# Patient Record
Sex: MALE | Race: OTHER | NOT HISPANIC OR LATINO | URBAN - METROPOLITAN AREA
[De-identification: names, ages, dates, MRNs, and addresses within clinical notes are randomized per-mention and may not be internally consistent; named-entity substitution may affect disease eponyms.]

---

## 2018-10-27 ENCOUNTER — INPATIENT (INPATIENT)
Facility: HOSPITAL | Age: 23
LOS: 1 days | Discharge: HOME | DRG: 918 | End: 2018-10-29
Attending: INTERNAL MEDICINE | Admitting: INTERNAL MEDICINE
Payer: MEDICAID

## 2018-10-27 VITALS
OXYGEN SATURATION: 95 % | WEIGHT: 160.06 LBS | TEMPERATURE: 98 F | HEART RATE: 138 BPM | SYSTOLIC BLOOD PRESSURE: 164 MMHG | RESPIRATION RATE: 16 BRPM | DIASTOLIC BLOOD PRESSURE: 87 MMHG

## 2018-10-27 DIAGNOSIS — F43.20 ADJUSTMENT DISORDER, UNSPECIFIED: ICD-10-CM

## 2018-10-27 DIAGNOSIS — F32.9 MAJOR DEPRESSIVE DISORDER, SINGLE EPISODE, UNSPECIFIED: ICD-10-CM
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
## 2018-10-27 DIAGNOSIS — T42.6X2A POISONING BY OTHER ANTIEPILEPTIC AND SEDATIVE-HYPNOTIC DRUGS, INTENTIONAL SELF-HARM, INITIAL ENCOUNTER: ICD-10-CM

## 2018-10-27 DIAGNOSIS — G47.00 INSOMNIA, UNSPECIFIED: ICD-10-CM

## 2018-10-27 DIAGNOSIS — E86.1 HYPOVOLEMIA: ICD-10-CM

## 2018-10-27 DIAGNOSIS — R45.851 SUICIDAL IDEATIONS: ICD-10-CM

## 2018-10-27 DIAGNOSIS — I45.81 LONG QT SYNDROME: ICD-10-CM

## 2018-10-27 DIAGNOSIS — R00.0 TACHYCARDIA, UNSPECIFIED: ICD-10-CM

## 2018-10-27 LAB
ALBUMIN SERPL ELPH-MCNC: 4.5 G/DL — SIGNIFICANT CHANGE UP (ref 3.4–5)
ALP SERPL-CCNC: 57 U/L — SIGNIFICANT CHANGE UP (ref 40–120)
ALT FLD-CCNC: 26 U/L — SIGNIFICANT CHANGE UP (ref 12–42)
ANION GAP SERPL CALC-SCNC: 10 MMOL/L — SIGNIFICANT CHANGE UP (ref 9–16)
APAP SERPL-MCNC: <2 UG/ML — LOW (ref 10–30)
AST SERPL-CCNC: 30 U/L — SIGNIFICANT CHANGE UP (ref 15–37)
BASOPHILS NFR BLD AUTO: 0.6 % — SIGNIFICANT CHANGE UP (ref 0–2)
BILIRUB SERPL-MCNC: 0.8 MG/DL — SIGNIFICANT CHANGE UP (ref 0.2–1.2)
BUN SERPL-MCNC: 19 MG/DL — SIGNIFICANT CHANGE UP (ref 7–23)
CALCIUM SERPL-MCNC: 10.4 MG/DL — SIGNIFICANT CHANGE UP (ref 8.5–10.5)
CHLORIDE SERPL-SCNC: 99 MMOL/L — SIGNIFICANT CHANGE UP (ref 96–108)
CO2 SERPL-SCNC: 25 MMOL/L — SIGNIFICANT CHANGE UP (ref 22–31)
CREAT SERPL-MCNC: 1.34 MG/DL — HIGH (ref 0.5–1.3)
EOSINOPHIL NFR BLD AUTO: 1.2 % — SIGNIFICANT CHANGE UP (ref 0–6)
ETHANOL SERPL-MCNC: < 3 MG/DL — SIGNIFICANT CHANGE UP
GLUCOSE SERPL-MCNC: 99 MG/DL — SIGNIFICANT CHANGE UP (ref 70–99)
HCT VFR BLD CALC: 46.4 % — SIGNIFICANT CHANGE UP (ref 39–50)
HGB BLD-MCNC: 16.4 G/DL — SIGNIFICANT CHANGE UP (ref 13–17)
IMM GRANULOCYTES NFR BLD AUTO: 0.2 % — SIGNIFICANT CHANGE UP (ref 0–1.5)
LYMPHOCYTES # BLD AUTO: 30 % — SIGNIFICANT CHANGE UP (ref 13–44)
MAGNESIUM SERPL-MCNC: 1.6 MG/DL — SIGNIFICANT CHANGE UP (ref 1.6–2.6)
MCHC RBC-ENTMCNC: 29.5 PG — SIGNIFICANT CHANGE UP (ref 27–34)
MCHC RBC-ENTMCNC: 35.3 G/DL — SIGNIFICANT CHANGE UP (ref 32–36)
MCV RBC AUTO: 83.6 FL — SIGNIFICANT CHANGE UP (ref 80–100)
MONOCYTES NFR BLD AUTO: 9.1 % — SIGNIFICANT CHANGE UP (ref 2–14)
NEUTROPHILS NFR BLD AUTO: 58.9 % — SIGNIFICANT CHANGE UP (ref 43–77)
PCP SPEC-MCNC: SIGNIFICANT CHANGE UP
PLATELET # BLD AUTO: 218 K/UL — SIGNIFICANT CHANGE UP (ref 150–400)
POTASSIUM SERPL-MCNC: 3.3 MMOL/L — LOW (ref 3.5–5.3)
POTASSIUM SERPL-SCNC: 3.3 MMOL/L — LOW (ref 3.5–5.3)
PROT SERPL-MCNC: 8.8 G/DL — HIGH (ref 6.4–8.2)
RBC # BLD: 5.55 M/UL — SIGNIFICANT CHANGE UP (ref 4.2–5.8)
RBC # FLD: 11.9 % — SIGNIFICANT CHANGE UP (ref 10.3–14.5)
SALICYLATES SERPL-MCNC: 4.6 MG/DL — SIGNIFICANT CHANGE UP (ref 2.8–20)
SODIUM SERPL-SCNC: 134 MMOL/L — SIGNIFICANT CHANGE UP (ref 132–145)
WBC # BLD: 6.6 K/UL — SIGNIFICANT CHANGE UP (ref 3.8–10.5)
WBC # FLD AUTO: 6.6 K/UL — SIGNIFICANT CHANGE UP (ref 3.8–10.5)

## 2018-10-27 PROCEDURE — 70450 CT HEAD/BRAIN W/O DYE: CPT | Mod: 26

## 2018-10-27 PROCEDURE — 99291 CRITICAL CARE FIRST HOUR: CPT

## 2018-10-27 PROCEDURE — 93010 ELECTROCARDIOGRAM REPORT: CPT

## 2018-10-27 RX ORDER — SODIUM CHLORIDE 9 MG/ML
2000 INJECTION INTRAMUSCULAR; INTRAVENOUS; SUBCUTANEOUS ONCE
Qty: 0 | Refills: 0 | Status: COMPLETED | OUTPATIENT
Start: 2018-10-27 | End: 2018-10-27

## 2018-10-27 RX ORDER — MIDAZOLAM HYDROCHLORIDE 1 MG/ML
4 INJECTION, SOLUTION INTRAMUSCULAR; INTRAVENOUS ONCE
Qty: 0 | Refills: 0 | Status: DISCONTINUED | OUTPATIENT
Start: 2018-10-27 | End: 2018-10-27

## 2018-10-27 RX ADMIN — MIDAZOLAM HYDROCHLORIDE 4 MILLIGRAM(S): 1 INJECTION, SOLUTION INTRAMUSCULAR; INTRAVENOUS at 21:31

## 2018-10-27 RX ADMIN — Medication 2 MILLIGRAM(S): at 20:45

## 2018-10-27 RX ADMIN — SODIUM CHLORIDE 2000 MILLILITER(S): 9 INJECTION INTRAMUSCULAR; INTRAVENOUS; SUBCUTANEOUS at 18:40

## 2018-10-27 RX ADMIN — Medication 2 MILLIGRAM(S): at 21:54

## 2018-10-27 NOTE — ED ADULT TRIAGE NOTE - CHIEF COMPLAINT QUOTE
as per EMS his boyfriend cheated on him and he took meds, "OD" on ambien and paroxetina (portugese med)

## 2018-10-27 NOTE — ED PROVIDER NOTE - SHIFT CHANGE DETAILS
24yo M with overdose on medications and possible unknown substance as suicidal gesture. upon arrival to the ED, patient is calm, cooperative, depressed appearing and crying. he tachycardic to 138. pupils are about 5mm bilaterally, round and reactive to light. no other findings noted on exam.  patient remains tachycardic despite 2 liter of NS.  labs and CT brain results reviewed - no acute findings noted.  pending PCC contact for further recommendations, re-eval and dispo.
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
## 2018-10-27 NOTE — ED ADULT NURSE REASSESSMENT NOTE - NS ED NURSE REASSESS COMMENT FT1
pt combative, biting lines and pulling monitoring equipment. Dr. Calderón aware. orders received and implemented. pt with patent airway, self maintained.

## 2018-10-27 NOTE — ED PROVIDER NOTE - PROGRESS NOTE DETAILS
Accept sign out from Dr. Abraham.  Pt had intentional OD on ambien and paroxetina (equivalent of Paxil).  Now with agitation and tachycardia.  Possible serotonin syndrome.  Will consult poison control center and maintain 1:1.  Will consult psych if pt stabilizes and medically clears. Accept sign out from Dr. Abraham.  Pt had intentional OD on ambien and paroxetina (equivalent of Paxil).  Now with agitation and tachycardia.  Possible serotonin syndrome.  Will consult poison control center and maintain 1:1.  Will consult psych if pt stabilizes and medically clears but may need to admit while he metabolizes.  Currently agitated but no cl Poison control center consulted.  They recommend 24 hours of observation, especially given prolonged QT.  Will add on magnesium level and monitor electrolytes.  Will replace lytes prn.  Will perform serial EKGs.  They report benzos are safe for sedation. Pt responds well to Ativan.  Will repeat EKG now.  Had discussion with Dr. Smiley of MICU and pt accepted to tele step down.

## 2018-10-27 NOTE — ED PROVIDER NOTE - OBJECTIVE STATEMENT
22 yo M visiting from [place], Cape Verdean speaking only presents to the ED with altered mental status after ingesting pills. As per EMS, the pt's boyfriend states he took "a bunch of ambien and paroxetina (Cape Verdean medicine), ran into the bathroom with a knife and said he was going to kill himself". Pt has a history of suicidal attempts in the past; he jumped off the roof of his home in [place]. Pt is unable to give a clear account; but he states he did want to kill himself. 24 yo M visiting from [place], Nigerien speaking only presents to the ED with altered mental status after ingesting pills. As per EMS, the pt's boyfriend states he took "a bunch of ambien and paroxetina (Chilean medicine), ran into the bathroom with a knife and said he was going to kill himself". Pt has a history of suicidal attempts in the past; he jumped off the roof of his home in [place]. Pt is unable to give a clear account; but he states he did want to kill himself. 22yo Mauritian speaking Male is BIBEMS from home for evaluation of suicidal ideation.  Patient is confused and is unable to provide an accurate account of today's events.     As per EMS, patient thought boyfriend was cheating on him and took several pills (Mauritian equivalent of Ambien and Paxil) and locked himself in the bathroom with knife, threatening to kill himself.     As per patient - he has had suicidal ideation and had a previous attempt where he jumped off the roof while in [place].   Patient at times denies taking the pills and at other times admits to having taken them as a suicidal gesture.  Patient also states he drank a "liquid" that he purchased on the "black market" to kill himself. Patient reports he does not know the name of the liquid he ingested or the time of ingestion.     no family present, but boyfriend (Eyad) is reportedly en route to the ED.  phone number: [phone]

## 2018-10-27 NOTE — ED ADULT NURSE REASSESSMENT NOTE - NS ED NURSE REASSESS COMMENT FT1
received pt from EWELINA Gutierrez. pt on continuous cardiac monitoring and constant observation. pt resting in stretcher. calm at this time.

## 2018-10-28 DIAGNOSIS — N17.9 ACUTE KIDNEY FAILURE, UNSPECIFIED: ICD-10-CM

## 2018-10-28 DIAGNOSIS — Z91.89 OTHER SPECIFIED PERSONAL RISK FACTORS, NOT ELSEWHERE CLASSIFIED: ICD-10-CM

## 2018-10-28 DIAGNOSIS — R63.8 OTHER SYMPTOMS AND SIGNS CONCERNING FOOD AND FLUID INTAKE: ICD-10-CM

## 2018-10-28 DIAGNOSIS — T50.902A POISONING BY UNSPECIFIED DRUGS, MEDICAMENTS AND BIOLOGICAL SUBSTANCES, INTENTIONAL SELF-HARM, INITIAL ENCOUNTER: ICD-10-CM

## 2018-10-28 DIAGNOSIS — Z29.9 ENCOUNTER FOR PROPHYLACTIC MEASURES, UNSPECIFIED: ICD-10-CM

## 2018-10-28 DIAGNOSIS — F43.20 ADJUSTMENT DISORDER, UNSPECIFIED: ICD-10-CM

## 2018-10-28 DIAGNOSIS — R00.0 TACHYCARDIA, UNSPECIFIED: ICD-10-CM

## 2018-10-28 DIAGNOSIS — R94.31 ABNORMAL ELECTROCARDIOGRAM [ECG] [EKG]: ICD-10-CM

## 2018-10-28 LAB
ALBUMIN SERPL ELPH-MCNC: 4.4 G/DL — SIGNIFICANT CHANGE UP (ref 3.3–5)
ALP SERPL-CCNC: 44 U/L — SIGNIFICANT CHANGE UP (ref 40–120)
ALT FLD-CCNC: 19 U/L — SIGNIFICANT CHANGE UP (ref 10–45)
ANION GAP SERPL CALC-SCNC: 14 MMOL/L — SIGNIFICANT CHANGE UP (ref 5–17)
AST SERPL-CCNC: 30 U/L — SIGNIFICANT CHANGE UP (ref 10–40)
BILIRUB SERPL-MCNC: 0.6 MG/DL — SIGNIFICANT CHANGE UP (ref 0.2–1.2)
BUN SERPL-MCNC: 14 MG/DL — SIGNIFICANT CHANGE UP (ref 7–23)
CALCIUM SERPL-MCNC: 9.1 MG/DL — SIGNIFICANT CHANGE UP (ref 8.4–10.5)
CHLORIDE SERPL-SCNC: 97 MMOL/L — SIGNIFICANT CHANGE UP (ref 96–108)
CO2 SERPL-SCNC: 24 MMOL/L — SIGNIFICANT CHANGE UP (ref 22–31)
CREAT SERPL-MCNC: 1.12 MG/DL — SIGNIFICANT CHANGE UP (ref 0.5–1.3)
GLUCOSE SERPL-MCNC: 107 MG/DL — HIGH (ref 70–99)
HCT VFR BLD CALC: 44.1 % — SIGNIFICANT CHANGE UP (ref 39–50)
HGB BLD-MCNC: 15.1 G/DL — SIGNIFICANT CHANGE UP (ref 13–17)
MAGNESIUM SERPL-MCNC: 2.8 MG/DL — HIGH (ref 1.6–2.6)
MCHC RBC-ENTMCNC: 28.9 PG — SIGNIFICANT CHANGE UP (ref 27–34)
MCHC RBC-ENTMCNC: 34.2 G/DL — SIGNIFICANT CHANGE UP (ref 32–36)
MCV RBC AUTO: 84.3 FL — SIGNIFICANT CHANGE UP (ref 80–100)
PHOSPHATE SERPL-MCNC: 3 MG/DL — SIGNIFICANT CHANGE UP (ref 2.5–4.5)
PLATELET # BLD AUTO: 194 K/UL — SIGNIFICANT CHANGE UP (ref 150–400)
POTASSIUM SERPL-MCNC: 3.8 MMOL/L — SIGNIFICANT CHANGE UP (ref 3.5–5.3)
POTASSIUM SERPL-SCNC: 3.8 MMOL/L — SIGNIFICANT CHANGE UP (ref 3.5–5.3)
PROT SERPL-MCNC: 7.1 G/DL — SIGNIFICANT CHANGE UP (ref 6–8.3)
RBC # BLD: 5.23 M/UL — SIGNIFICANT CHANGE UP (ref 4.2–5.8)
RBC # FLD: 12.2 % — SIGNIFICANT CHANGE UP (ref 10.3–16.9)
SODIUM SERPL-SCNC: 135 MMOL/L — SIGNIFICANT CHANGE UP (ref 135–145)
WBC # BLD: 5.4 K/UL — SIGNIFICANT CHANGE UP (ref 3.8–10.5)
WBC # FLD AUTO: 5.4 K/UL — SIGNIFICANT CHANGE UP (ref 3.8–10.5)

## 2018-10-28 PROCEDURE — 99232 SBSQ HOSP IP/OBS MODERATE 35: CPT | Mod: GC

## 2018-10-28 PROCEDURE — 99233 SBSQ HOSP IP/OBS HIGH 50: CPT | Mod: GC

## 2018-10-28 PROCEDURE — 71045 X-RAY EXAM CHEST 1 VIEW: CPT | Mod: 26

## 2018-10-28 PROCEDURE — 93010 ELECTROCARDIOGRAM REPORT: CPT

## 2018-10-28 RX ORDER — INFLUENZA VIRUS VACCINE 15; 15; 15; 15 UG/.5ML; UG/.5ML; UG/.5ML; UG/.5ML
0.5 SUSPENSION INTRAMUSCULAR ONCE
Qty: 0 | Refills: 0 | Status: COMPLETED | OUTPATIENT
Start: 2018-10-28 | End: 2018-10-29

## 2018-10-28 RX ORDER — MIDAZOLAM HYDROCHLORIDE 1 MG/ML
2 INJECTION, SOLUTION INTRAMUSCULAR; INTRAVENOUS ONCE
Qty: 0 | Refills: 0 | Status: DISCONTINUED | OUTPATIENT
Start: 2018-10-28 | End: 2018-10-28

## 2018-10-28 RX ORDER — IPRATROPIUM/ALBUTEROL SULFATE 18-103MCG
3 AEROSOL WITH ADAPTER (GRAM) INHALATION ONCE
Qty: 0 | Refills: 0 | Status: COMPLETED | OUTPATIENT
Start: 2018-10-28 | End: 2018-10-28

## 2018-10-28 RX ORDER — POTASSIUM CHLORIDE 20 MEQ
10 PACKET (EA) ORAL
Qty: 0 | Refills: 0 | Status: COMPLETED | OUTPATIENT
Start: 2018-10-28 | End: 2018-10-28

## 2018-10-28 RX ORDER — HEPARIN SODIUM 5000 [USP'U]/ML
5000 INJECTION INTRAVENOUS; SUBCUTANEOUS EVERY 8 HOURS
Qty: 0 | Refills: 0 | Status: DISCONTINUED | OUTPATIENT
Start: 2018-10-28 | End: 2018-10-29

## 2018-10-28 RX ORDER — MAGNESIUM SULFATE 500 MG/ML
2 VIAL (ML) INJECTION ONCE
Qty: 0 | Refills: 0 | Status: COMPLETED | OUTPATIENT
Start: 2018-10-28 | End: 2018-10-28

## 2018-10-28 RX ADMIN — Medication 50 GRAM(S): at 05:24

## 2018-10-28 RX ADMIN — Medication 100 MILLIEQUIVALENT(S): at 08:39

## 2018-10-28 RX ADMIN — HEPARIN SODIUM 5000 UNIT(S): 5000 INJECTION INTRAVENOUS; SUBCUTANEOUS at 23:33

## 2018-10-28 RX ADMIN — Medication 3 MILLILITER(S): at 23:33

## 2018-10-28 RX ADMIN — Medication 100 MILLIEQUIVALENT(S): at 05:55

## 2018-10-28 RX ADMIN — Medication 100 MILLIEQUIVALENT(S): at 07:15

## 2018-10-28 RX ADMIN — MIDAZOLAM HYDROCHLORIDE 2 MILLIGRAM(S): 1 INJECTION, SOLUTION INTRAMUSCULAR; INTRAVENOUS at 01:32

## 2018-10-28 RX ADMIN — HEPARIN SODIUM 5000 UNIT(S): 5000 INJECTION INTRAVENOUS; SUBCUTANEOUS at 13:54

## 2018-10-28 RX ADMIN — HEPARIN SODIUM 5000 UNIT(S): 5000 INJECTION INTRAVENOUS; SUBCUTANEOUS at 05:55

## 2018-10-28 NOTE — PROGRESS NOTE ADULT - PROBLEM SELECTOR PLAN 1
Admits to suicide attempt and ex-boyfriend (larisa) confirms previous attempts. S/p 6mg total versed and 4mg total of ativan for agitation at [facility]. Patient redirected this AM upon arrival and acute agitation.  -psych for SI  -c/w Constant obs 1:1  -Hold Home Paxil and ambien in setting of OD on these medications.   -qtc this am 442, medically cleared for psych  - UTox negative Admits to suicide attempt and ex-boyfriend (larisa) confirms previous attempts. S/p 6mg total versed and 4mg total of ativan for agitation at [facility]. Patient redirected this AM upon arrival and acute agitation.  -Atarax 50mg q6H prn insomnia/agitation/anxiety as per psych  -psych consulted for SI, will eval for possibly 8ur admission  -c/w Constant obs 1:1  -Hold Home Paxil and ambien in setting of OD on these medications.   -qtc this am 442, medically cleared for psych  - UTox negative

## 2018-10-28 NOTE — H&P ADULT - ATTENDING COMMENTS
Suspected ambien and SSRI overdose (hx of prior attempts and SI/HI). No concern for serotonin syndrome. Hemodynamically stable. Floor eligible.    35 minutes critical time spent.

## 2018-10-28 NOTE — PROGRESS NOTE ADULT - SUBJECTIVE AND OBJECTIVE BOX
TRANSFER to PSYCH:  23M with PMH of depression presenting for overdose of home paxil and ambien in a suicide attempt. Patient admitted to [facility] for further cardiac telemetry monitoring in setting of OD with noted QTc prolongation (579 in ED). Per pts ex-bf, patient took the bottle of pills and locked himself in the bathroom to take the full bottles of his medication for which are reported to be: Roxetin and hemitartarate zolpidem for which are the [place] citizen version of Ambien and paxil). The boyfriend attempted to get the patient to through up- but he became less coordinated and remained combative for which the ex-boyfriend had to call the police. Patient was brought to [facility]. VS on presentation: 164/87, , RR 16, T 98F, O2 Sat 95% on RA. EKG notable for QTc 579. Labs notable for Cr 1.34, Mg 1.6, K 3.3. Patient given 2L of fluid. Patient also noted to be combative and given total of 4mg ativan and 6mg versed. Patient arrived to [facility] and lethargic but arousable. Oriented x1 (does not know how he got to [facility] or time/year). Denied chest pain, shortness of breath. + Dizziness/fatigue. Denies nausea, vomiting, abdominal pain. 10/28 am pt stable, ekg showed qtc of 442. Pt medically stable for transfer to psych unit for SI.     NAEO    S: Pt denies pain. No acute complaints.     VITAL SIGNS:  Vital Signs Last 24 Hrs  T(C): 37.8 (28 Oct 2018 05:27), Max: 37.8 (28 Oct 2018 05:27)  T(F): 100.1 (28 Oct 2018 05:27), Max: 100.1 (28 Oct 2018 05:27)  HR: 86 (28 Oct 2018 05:14) (86 - 138)  BP: 124/62 (28 Oct 2018 05:14) (121/58 - 164/87)  BP(mean): 85 (28 Oct 2018 05:14) (85 - 106)  RR: 16 (28 Oct 2018 05:14) (16 - 18)  SpO2: 96% (28 Oct 2018 05:14) (95% - 98%)      .  VITAL SIGNS:  T(C): 37.8 (10-28-18 @ 05:27), Max: 37.8 (10-28-18 @ 05:27)  T(F): 100.1 (10-28-18 @ 05:27), Max: 100.1 (10-28-18 @ 05:27)  HR: 86 (10-28-18 @ 05:14) (86 - 138)  BP: 124/62 (10-28-18 @ 05:14) (121/58 - 164/87)  BP(mean): 85 (10-28-18 @ 05:14) (85 - 106)  RR: 16 (10-28-18 @ 05:14) (16 - 18)  SpO2: 96% (10-28-18 @ 05:14) (95% - 98%)  Wt(kg): --    PHYSICAL EXAM:  Constitutional: WDWN resting comfortably in bed; NAD  respiratory: CTA B/L; no W/R/R, no retractions  Cardiac: +S1/S2; RRR; no M/R/G; PMI non-displaced  Gastrointestinal: soft, NT/ND; no rebound or guarding; +BSx4  Extremities: WWP, no clubbing or cyanosis; no peripheral edema  Musculoskeletal: NROM x4; no joint swelling, tenderness or erythema  Dermatologic: skin warm, dry and intact; no rashes, wounds, or scars  Neurologic: no focal deficits      MEDICATIONS:  MEDICATIONS  (STANDING):  heparin  Injectable 5000 Unit(s) SubCutaneous every 8 hours  influenza   Vaccine 0.5 milliLiter(s) IntraMuscular once    MEDICATIONS  (PRN):      ALLERGIES:  Allergies    No Known Allergies    Intolerances        LABS:                        15.1   5.4   )-----------( 194      ( 28 Oct 2018 07:09 )             44.1     10-28    135  |  97  |  14  ----------------------------<  107<H>  3.8   |  24  |  1.12    Ca    9.1      28 Oct 2018 07:07  Phos  3.0     10-28  Mg     2.8     10-28    TPro  7.1  /  Alb  4.4  /  TBili  0.6  /  DBili  x   /  AST  30  /  ALT  19  /  AlkPhos  44  10-28      Fingerstick  glucose: POCT Blood Glucose.: 92 mg/dL (27 Oct 2018 17:55)      RADIOLOGY & ADDITIONAL TESTS: Reviewed.              CT Head No Cont:   EXAM:  CT BRAIN                           PROCEDURE DATE:  10/27/2018          INTERPRETATION:  CT OF THE HEAD WITHOUT CONTRAST    INDICATION:  AMS    TECHNIQUE: An axial noncontrast CT scan of the head was performed.   Sagittal and coronal reformatted images were also obtained.    CONTRAST:  None    COMPARISON:  None    FINDINGS:  There is no hydrocephalus. The basal cisterns are patent. There is no   focal mass effect or midline shift. There are no extra-axial collections   or intraparenchymal hemorrhage.    Gray-white matter differentiation is intact. There is no evidence of   acute transcortical territorial infarction.    The globes and retrobulbar fat are intact.    Polypoid mucosal thickening with small air-fluid level within the left  maxillary sinus.. The calvaria is intact.    IMPRESSION:  No hydrocephalus, midline shift, acute intracranial hemorrhage or   demarcated territorial infarct.              "Thank you for the opportunity to participate in the care of this   patient."        HAMLET COSME M.D., ATTENDING RADIOLOGIST  This document has been electronically signed.  Oct 27 2018  6:50PM               (10-27-18 @ 18:41) TRANSFER to [facility]:  23M with PMH of depression presenting for overdose of home paxil and ambien in a suicide attempt. Patient admitted to [facility] for further cardiac telemetry monitoring in setting of OD with noted QTc prolongation (579 in ED). Per pts ex-bf, patient took the bottle of pills and locked himself in the bathroom to take the full bottles of his medication for which are reported to be: Roxetin and hemitartarate zolpidem for which are the German version of Ambien and paxil). The boyfriend attempted to get the patient to through up- but he became less coordinated and remained combative for which the ex-boyfriend had to call the police. Patient was brought to [facility]. VS on presentation: 164/87, , RR 16, T 98F, O2 Sat 95% on RA. EKG notable for QTc 579. Labs notable for Cr 1.34, Mg 1.6, K 3.3. Patient given 2L of fluid. Patient also noted to be combative and given total of 4mg ativan and 6mg versed. Patient arrived to [facility] and lethargic but arousable. Oriented x1 (does not know how he got to [facility] or time/year). Denied chest pain, shortness of breath. + Dizziness/fatigue. Denies nausea, vomiting, abdominal pain. 10/28 am pt stable, ekg showed qtc of 442. Pt medically stable for transfer to [facility], awaiting a psych unit bed for SI.     NAEO    S: Pt denies pain. No acute complaints.     VITAL SIGNS:  Vital Signs Last 24 Hrs  T(C): 37.8 (28 Oct 2018 05:27), Max: 37.8 (28 Oct 2018 05:27)  T(F): 100.1 (28 Oct 2018 05:27), Max: 100.1 (28 Oct 2018 05:27)  HR: 86 (28 Oct 2018 05:14) (86 - 138)  BP: 124/62 (28 Oct 2018 05:14) (121/58 - 164/87)  BP(mean): 85 (28 Oct 2018 05:14) (85 - 106)  RR: 16 (28 Oct 2018 05:14) (16 - 18)  SpO2: 96% (28 Oct 2018 05:14) (95% - 98%)      .  VITAL SIGNS:  T(C): 37.8 (10-28-18 @ 05:27), Max: 37.8 (10-28-18 @ 05:27)  T(F): 100.1 (10-28-18 @ 05:27), Max: 100.1 (10-28-18 @ 05:27)  HR: 86 (10-28-18 @ 05:14) (86 - 138)  BP: 124/62 (10-28-18 @ 05:14) (121/58 - 164/87)  BP(mean): 85 (10-28-18 @ 05:14) (85 - 106)  RR: 16 (10-28-18 @ 05:14) (16 - 18)  SpO2: 96% (10-28-18 @ 05:14) (95% - 98%)  Wt(kg): --    PHYSICAL EXAM:  Constitutional: WDWN resting comfortably in bed; NAD  respiratory: CTA B/L; no W/R/R, no retractions  Cardiac: +S1/S2; RRR; no M/R/G; PMI non-displaced  Gastrointestinal: soft, NT/ND; no rebound or guarding; +BSx4  Extremities: WWP, no clubbing or cyanosis; no peripheral edema  Musculoskeletal: NROM x4; no joint swelling, tenderness or erythema  Dermatologic: skin warm, dry and intact; no rashes, wounds, or scars  Neurologic: no focal deficits      MEDICATIONS:  MEDICATIONS  (STANDING):  heparin  Injectable 5000 Unit(s) SubCutaneous every 8 hours  influenza   Vaccine 0.5 milliLiter(s) IntraMuscular once    MEDICATIONS  (PRN):      ALLERGIES:  Allergies    No Known Allergies    Intolerances        LABS:                        15.1   5.4   )-----------( 194      ( 28 Oct 2018 07:09 )             44.1     10-28    135  |  97  |  14  ----------------------------<  107<H>  3.8   |  24  |  1.12    Ca    9.1      28 Oct 2018 07:07  Phos  3.0     10-28  Mg     2.8     10-28    TPro  7.1  /  Alb  4.4  /  TBili  0.6  /  DBili  x   /  AST  30  /  ALT  19  /  AlkPhos  44  10-28      Fingerstick  glucose: POCT Blood Glucose.: 92 mg/dL (27 Oct 2018 17:55)      RADIOLOGY & ADDITIONAL TESTS: Reviewed.              CT Head No Cont:   EXAM:  CT BRAIN                           PROCEDURE DATE:  10/27/2018          INTERPRETATION:  CT OF THE HEAD WITHOUT CONTRAST    INDICATION:  AMS    TECHNIQUE: An axial noncontrast CT scan of the head was performed.   Sagittal and coronal reformatted images were also obtained.    CONTRAST:  None    COMPARISON:  None    FINDINGS:  There is no hydrocephalus. The basal cisterns are patent. There is no   focal mass effect or midline shift. There are no extra-axial collections   or intraparenchymal hemorrhage.    Gray-white matter differentiation is intact. There is no evidence of   acute transcortical territorial infarction.    The globes and retrobulbar fat are intact.    Polypoid mucosal thickening with small air-fluid level within the left  maxillary sinus.. The calvaria is intact.    IMPRESSION:  No hydrocephalus, midline shift, acute intracranial hemorrhage or   demarcated territorial infarct.              "Thank you for the opportunity to participate in the care of this   patient."        HAMLET COSME M.D., ATTENDING RADIOLOGIST  This document has been electronically signed.  Oct 27 2018  6:50PM               (10-27-18 @ 18:41)

## 2018-10-28 NOTE — BEHAVIORAL HEALTH ASSESSMENT NOTE - PROBLEM SELECTOR PLAN 1
-continue CO 1:1 for safety  -medical treatment per medical team  -Psychiatry CL team to follow for possible admission to inpatient psych due to impulsivity, overdose/SA.   -Atarax 50mg q6H prn for insomnia/agitation/anxiety

## 2018-10-28 NOTE — PROGRESS NOTE ADULT - ASSESSMENT
ASSESSMENT:  23M with PMH of depression presenting for overdose of home paxil and ambien in a suicide attempt. Patient admitted to [facility] for further cardiac telemetry monitoring in setting of OD with noted QTc prolongation (579 in ED). Now medically stable for transfer to psych for SI.     Neuro/Psych:  #Suicide attempt  Patient admits to suicide attempt and for which ex-boyfriend (larisa) confirms previous attempts. S/p 6mg total versed and 4mg total of avitan for agitation at [facility]. Patient redirected this AM upon arrival and acute agitation.  -psych for SI  -c/w Constant obs 1:1  -Hold Home Paxil and ambien in setting of OD on these medications.   -qtc this am 442, medically cleared for psych    Cardiac  #QTc prolongation  -Noted QTc prolongation in setting of OD on paxil. QTc at 579 in ED. Repeat EKG on arrival Qtc at 442      Prophylactic Measures  VTE: HSQ and SCDs  FULL CODE  Dispo: transfer to 8U psych    F:  PO intake.  E: Replete electrolytes to maintain K>4 and Mg>2  N:  Regular Diet as tolerated ASSESSMENT:  23M with PMH of depression presenting for overdose of home paxil and ambien in a suicide attempt. [facility] setting of OD with noted QTc prolongation (579 in ED). Now medically stable for transfer to psych for SI.     Neuro/Psych:  #Suicide attempt  Patient admits to suicide attempt and for which ex-boyfriend (larisa) confirms previous attempts. S/p 6mg total versed and 4mg total of avitan for agitation at [facility]. Patient redirected this AM upon arrival and acute agitation.  -psych for SI  -c/w Constant obs 1:1  -Hold Home Paxil and ambien in setting of OD on these medications.   -qtc this am 442, medically cleared for psych    Cardiac  #QTc prolongation  -Noted QTc prolongation in setting of OD on paxil. QTc at 579 in ED. Repeat EKG on arrival Qtc at 442      Prophylactic Measures  VTE: HSQ and SCDs  FULL CODE  Dispo: transfer to 8U psych, or F    F:  PO intake.  E: Replete electrolytes to maintain K>4 and Mg>2  N:  Regular Diet as tolerated

## 2018-10-28 NOTE — H&P ADULT - NSHPLABSRESULTS_GEN_ALL_CORE
LABS:                        16.4   6.6   )-----------( 218      ( 27 Oct 2018 18:32 )             46.4     10-27    134  |  99  |  19  ----------------------------<  99  3.3<L>   |  25  |  1.34<H>    Ca    10.4      27 Oct 2018 18:32  Mg     1.6     10-27    TPro  8.8<H>  /  Alb  4.5  /  TBili  0.8  /  DBili  x   /  AST  30  /  ALT  26  /  AlkPhos  57  10-27

## 2018-10-28 NOTE — H&P ADULT - HISTORY OF PRESENT ILLNESS
23M with PMH of insomnia, depression presenting for overdose.     history mostly obtained from ex boyfriend (patient gave permission contact ex- boyfriend) dispute between ex boyfriend- recent break up- became agitated and took bottle of pills - assuming that he was using something else because already agitated on arrival. Trying to convince to patient to throw up but very agitated and called police (reported Roxetin and hemitartarate zolpidem Swedish version of-  Ambien and paxil)   reports hx of suicide attempts (prescriptions for medications- in [facility] while living at his fathers house)- saw psychiatrist 3months ago.   2months ago- came to [place].  Nov 5 planned to go back to [facility]. 23M with PMH of insomnia, depression presenting for overdose. Significant portion of history obtained from ex-boyfriend as patient confused on presentation- Oriented x1 and unsure of how he got to [facility].    As per the ex-boyfriend- the patient has a history of depression and suicide attempts in the past (most recently 3 months ago for which he overdosed on nausea medications. He was living at his fathers house in [facility] at the time and was following with a psychiatrist after that attempt). Patient is visiting from [facility] with his now ex-boyfriend for 2 months and due to go home to [place] on November 5th. The ex-boyfriend reports they had been  dating for 6 months and came together to [place] to visit while he works and have been living together in an Airbnb. They recently broke up- as the ex-boyfriend broke up with the patient but had agreed to live together while still in [place] because the patient had no where else to go. On Saturday during the day, the ex-boyfriend was at his work and when he returned the patient was acutely agitated and combative. Typically the ex-boyfriend keeps the medications in his position 2/2 to past attempts that the patient has made to OD on his medications. The ex-boyfriend was concerned he may have taken some other drug that made him acutely agitated and combative. During this fight, patient took the bottle of pills and locked himself in the bathroom to take the full bottles of his medication for which are reported to be: Roxetin and hemitartarate zolpidem for which are the Swiss version of Ambien and paxil). The boyfriend attempted to get the patient to through up- but he became less coordinated and remained combative for which the ex-boyfriend had to call the police.     Patient was brought to [facility]. VS on presentation: 164/87, , RR 16, T 98F, O2 Sat 95% on RA. combative and given total of 4mg ativan and 6mg versed. 23M Maldivian speaking with PMH of insomnia, depression presenting for overdose. Significant portion of history obtained from ex-boyfriend as patient confused on presentation- Oriented x1 and unsure of how he got to [facility].    As per the ex-boyfriend- the patient has a history of depression and suicide attempts in the past (most recently 3 months ago for which he overdosed on nausea medications. He was living at his fathers house in [facility] at the time and was following with a psychiatrist after that attempt). Patient is visiting from [facility] with his now ex-boyfriend for 2 months and due to go home to [facility] on November 5th. The ex-boyfriend reports they had been  dating for 6 months and came together to [place] to visit while he works and have been living together in an Airbnb. They recently broke up- as the ex-boyfriend broke up with the patient but had agreed to live together while still in [place] because the patient had no where else to go. On Saturday during the day, the ex-boyfriend was at his work and when he returned the patient was acutely agitated and combative. Typically the ex-boyfriend keeps the medications in his position 2/2 to past attempts that the patient has made to OD on his medications. The ex-boyfriend was concerned he may have taken some other drug that made him acutely agitated and combative. During this fight, patient took the bottle of pills and locked himself in the bathroom to take the full bottles of his medication for which are reported to be: Roxetin and hemitartarate zolpidem for which are the Maldivian version of Ambien and paxil). The boyfriend attempted to get the patient to through up- but he became less coordinated and remained combative for which the ex-boyfriend had to call the police.     Patient was brought to [facility]. VS on presentation: 164/87, , RR 16, T 98F, O2 Sat 95% on RA. EKG notable for QTc 579. Labs notable for Cr 1.34, Mg 1.6, K 3.3. Patient given 2L of fluid. Patient also noted to be combative and given total of 4mg ativan and 6mg versed.   Patient arrived to [facility] and lethargic but arousable. Oriented x1 (does not know how he got to [facility] or time/year). Denied chest pain, shortness of breath. + Dizziness/fatigue. Denies nausea, vomiting, abdominal pain.

## 2018-10-28 NOTE — PROGRESS NOTE ADULT - SUBJECTIVE AND OBJECTIVE BOX
PGY-1 TRANSFER NOTE TELEMETRY TO [facility]  HOSPITAL COURSE:  23M with PMH of depression presenting for overdose of home paxil and ambien in a suicide attempt. Patient admitted to [facility] for further cardiac telemetry monitoring in setting of OD with noted QTc prolongation (579 in ED). Per pt's ex-bf, patient took the bottle of pills and locked himself in the bathroom to take the full bottles of his medication for which are reported to be: Roxetin and hemitartarate zolpidem for which are the [place] version of Ambien and paxil. The boyfriend attempted to get the patient to vomit, but he became less coordinated and remained combative for which the ex-boyfriend had to call the police. Patient was brought to [facility]. VS on presentation: 164/87, , RR 16, T 98F, O2 Sat 95% on RA. EKG notable for QTc 579. Labs notable for Cr 1.34, Mg 1.6, K 3.3. Patient given 2L of fluid. Patient also noted to be combative and given total of 4mg ativan and 6mg versed. Patient arrived to [facility] and lethargic but arousable. Oriented x1 (does not know how he got to [facility] or time/year). Denied chest pain, shortness of breath. + Dizziness/fatigue. Denies nausea, vomiting, abdominal pain. 10/28 am pt stable, ekg showed qtc of 442. Pt medically stable for transfer to [facility], awaiting a psych unit bed for SI.       SUBJECTIVE / INTERVAL HPI: Patient seen and examined at bedside.     VITAL SIGNS:  Vital Signs Last 24 Hrs  T(C): 36 (28 Oct 2018 14:29), Max: 37.8 (28 Oct 2018 05:27)  T(F): 96.8 (28 Oct 2018 14:29), Max: 100.1 (28 Oct 2018 05:27)  HR: 98 (28 Oct 2018 17:31) (86 - 110)  BP: 126/63 (28 Oct 2018 17:31) (121/58 - 156/72)  BP(mean): 86 (28 Oct 2018 17:31) (80 - 106)  RR: 23 (28 Oct 2018 17:31) (15 - 26)  SpO2: 95% (28 Oct 2018 17:31) (95% - 98%)    PHYSICAL EXAM:    General: WDWN  HEENT: NC/AT; PERRL, anicteric sclera; MMM  Neck: supple  Cardiovascular: +S1/S2, RRR  Respiratory: CTA B/L; no W/R/R  Gastrointestinal: soft, NT/ND; +BSx4  Extremities: WWP; no edema, clubbing or cyanosis  Vascular: 2+ radial, DP/PT pulses B/L  Neurological: AAOx3; no focal deficits    MEDICATIONS:  MEDICATIONS  (STANDING):  heparin  Injectable 5000 Unit(s) SubCutaneous every 8 hours  influenza   Vaccine 0.5 milliLiter(s) IntraMuscular once    MEDICATIONS  (PRN):      ALLERGIES:  Allergies    No Known Allergies    Intolerances        LABS:                        15.1   5.4   )-----------( 194      ( 28 Oct 2018 07:09 )             44.1     10-28    135  |  97  |  14  ----------------------------<  107<H>  3.8   |  24  |  1.12    Ca    9.1      28 Oct 2018 07:07  Phos  3.0     10-28  Mg     2.8     10-28    TPro  7.1  /  Alb  4.4  /  TBili  0.6  /  DBili  x   /  AST  30  /  ALT  19  /  AlkPhos  44  10-28        CAPILLARY BLOOD GLUCOSE      POCT Blood Glucose.: 92 mg/dL (27 Oct 2018 17:55)      RADIOLOGY & ADDITIONAL TESTS: Reviewed. PGY-1 TRANSFER NOTE TELEMETRY TO [facility]  HOSPITAL COURSE:  23M with PMH of depression presenting for overdose of home paxil and ambien in a suicide attempt. Patient admitted to [facility] for further cardiac telemetry monitoring in setting of OD with noted QTc prolongation (579 in ED). Per pt's ex-bf, patient took the bottle of pills and locked himself in the bathroom to take the full bottles of his medication for which are reported to be: Roxetin and hemitartarate zolpidem for which are the Salvadorean version of Ambien and paxil. The boyfriend attempted to get the patient to vomit, but he became less coordinated and remained combative for which the ex-boyfriend had to call the police. Patient was brought to [facility]. VS on presentation: 164/87, , RR 16, T 98F, O2 Sat 95% on RA. EKG notable for QTc 579. Labs notable for Cr 1.34, Mg 1.6, K 3.3. Patient given 2L of fluid. Patient also noted to be combative and given total of 4mg ativan and 6mg versed. Patient arrived to [facility] and lethargic but arousable. Oriented x1 (does not know how he got to [facility] or time/year). Denied chest pain, shortness of breath. + Dizziness/fatigue. Denies nausea, vomiting, abdominal pain. 10/28 am pt stable, ekg showed qtc of 442. Pt medically stable for transfer to [facility], possible admission to psychiatric unit.     SUBJECTIVE / INTERVAL HPI: Patient seen and examined at bedside. Patient reports that most of the symptoms that he had on arrival have resolved. He is feeling cold, but otherwise ROS is negative. Reports that he has no SI at this time and says that he event that brought him in was not a suicide attempt. Instead, he says that he was just upset and being impulsive.     VITAL SIGNS:  Vital Signs Last 24 Hrs  T(C): 36 (28 Oct 2018 14:29), Max: 37.8 (28 Oct 2018 05:27)  T(F): 96.8 (28 Oct 2018 14:29), Max: 100.1 (28 Oct 2018 05:27)  HR: 98 (28 Oct 2018 17:31) (86 - 110)  BP: 126/63 (28 Oct 2018 17:31) (121/58 - 156/72)  BP(mean): 86 (28 Oct 2018 17:31) (80 - 106)  RR: 23 (28 Oct 2018 17:31) (15 - 26)  SpO2: 95% (28 Oct 2018 17:31) (95% - 98%)    PHYSICAL EXAM:    General: WDWN, Salvadorean speaking, talks quietly  HEENT: NC/AT; enlarged pupils, but equal round and reactive to light, anicteric sclera; MMM  Neck: supple, no JVD  Cardiovascular: +S1/S2, RRR  Respiratory: CTA B/L; no W/R/R  Gastrointestinal: soft, NT/ND; +BSx4  Extremities: WWP; no edema, clubbing or cyanosis  Vascular: 2+ radial, DP/PT pulses B/L  Neurological: AAOx3; no focal deficits  Psych: denies any SI or HI.     MEDICATIONS:  MEDICATIONS  (STANDING):  heparin  Injectable 5000 Unit(s) SubCutaneous every 8 hours  influenza   Vaccine 0.5 milliLiter(s) IntraMuscular once    MEDICATIONS  (PRN):      ALLERGIES:  Allergies    No Known Allergies    LABS:                        15.1   5.4   )-----------( 194      ( 28 Oct 2018 07:09 )             44.1     10-28    135  |  97  |  14  ----------------------------<  107<H>  3.8   |  24  |  1.12    Ca    9.1      28 Oct 2018 07:07  Phos  3.0     10-28  Mg     2.8     10-28    TPro  7.1  /  Alb  4.4  /  TBili  0.6  /  DBili  x   /  AST  30  /  ALT  19  /  AlkPhos  44  10-28        CAPILLARY BLOOD GLUCOSE      POCT Blood Glucose.: 92 mg/dL (27 Oct 2018 17:55)      RADIOLOGY & ADDITIONAL TESTS: Reviewed. PGY-1 TRANSFER NOTE TELEMETRY TO [facility]  HOSPITAL COURSE:  23M with PMH of depression presenting for overdose of home paxil and ambien in a suicide attempt. Patient admitted to [facility] for further cardiac telemetry monitoring in setting of OD with noted QTc prolongation (579 in ED). Per pt's ex-bf, patient took the bottle of pills and locked himself in the bathroom to take the full bottles of his medication for which are reported to be: Roxetin and hemitartarate zolpidem for which are the [place]an version of Ambien and paxil. The boyfriend attempted to get the patient to vomit, but he became less coordinated and remained combative for which the ex-boyfriend had to call the police. Patient was brought to [facility]. VS on presentation: 164/87, , RR 16, T 98F, O2 Sat 95% on RA. EKG notable for QTc 579. Labs notable for Cr 1.34, Mg 1.6, K 3.3. Patient given 2L of fluid. Patient also noted to be combative and given total of 4mg ativan and 6mg versed. Patient arrived to [facility] and lethargic but arousable. Oriented x1 (does not know how he got to [facility] or time/year). Denied chest pain, shortness of breath. + Dizziness/fatigue. Denies nausea, vomiting, abdominal pain. 10/28 am pt stable, ekg showed qtc of 442. Pt medically stable for transfer to [facility], possible admission to psychiatric unit.     SUBJECTIVE / INTERVAL HPI: Patient seen and examined at bedside. Patient reports that most of the symptoms that he had on arrival have resolved. He is feeling cold, but otherwise 12 pt ROS is negative. Reports that he has no SI at this time and says that he event that brought him in was not a suicide attempt. Instead, he says that he was just upset and being impulsive.     VITAL SIGNS:  Vital Signs Last 24 Hrs  T(C): 36 (28 Oct 2018 14:29), Max: 37.8 (28 Oct 2018 05:27)  T(F): 96.8 (28 Oct 2018 14:29), Max: 100.1 (28 Oct 2018 05:27)  HR: 98 (28 Oct 2018 17:31) (86 - 110)  BP: 126/63 (28 Oct 2018 17:31) (121/58 - 156/72)  BP(mean): 86 (28 Oct 2018 17:31) (80 - 106)  RR: 23 (28 Oct 2018 17:31) (15 - 26)  SpO2: 95% (28 Oct 2018 17:31) (95% - 98%)    PHYSICAL EXAM:    General: WDWN, Venezuelan speaking, talks quietly  HEENT: NC/AT; enlarged pupils, but equal round and reactive to light, anicteric sclera; MMM  Neck: supple, no JVD  Cardiovascular: +S1/S2, RRR  Respiratory: CTA B/L; no W/R/R  Gastrointestinal: soft, NT/ND; +BSx4  Extremities: WWP; no edema, clubbing or cyanosis  Vascular: 2+ radial, DP/PT pulses B/L  Neurological: AAOx3; no focal deficits  Psych: denies any SI or HI.     MEDICATIONS:  MEDICATIONS  (STANDING):  heparin  Injectable 5000 Unit(s) SubCutaneous every 8 hours  influenza   Vaccine 0.5 milliLiter(s) IntraMuscular once    MEDICATIONS  (PRN):      ALLERGIES:  Allergies    No Known Allergies    LABS:                        15.1   5.4   )-----------( 194      ( 28 Oct 2018 07:09 )             44.1     10-28    135  |  97  |  14  ----------------------------<  107<H>  3.8   |  24  |  1.12    Ca    9.1      28 Oct 2018 07:07  Phos  3.0     10-28  Mg     2.8     10-28    TPro  7.1  /  Alb  4.4  /  TBili  0.6  /  DBili  x   /  AST  30  /  ALT  19  /  AlkPhos  44  10-28        CAPILLARY BLOOD GLUCOSE      POCT Blood Glucose.: 92 mg/dL (27 Oct 2018 17:55)      RADIOLOGY & ADDITIONAL TESTS: Reviewed.

## 2018-10-28 NOTE — PROGRESS NOTE ADULT - PROBLEM SELECTOR PLAN 3
SCr on admission 1.34, downtrended to 1.12. Unclear baseline. Possibly 2/2 hypovolemia 2/2 decreased PO intake vs med effect from overdose.   - Obtain collateral re: baseline kidney function  - Trend BMP Found to be in sinus tach up to the 110s on tele monitor. As patient calms down his HR seems to stay in the 90s. Likely 2/2 drug overdose  - Continue to monitor with EKG in the AM  - if not improving and patient looks dry, consider starting on fluids or giving a 500 cc bolus.

## 2018-10-28 NOTE — H&P ADULT - ASSESSMENT
ASSESSMENT:  23M with PMH of depression presenting for overdose of home paxil and ambien in a suicide attempt. Patient admitted to [facility] for further cardiac telemetry monitoring in setting of OD with noted QTc prolongation (579 in ED).     Plan:    Neuro/Psych:  #Suicide attempt  Patient admits to suicide attempt and for which ex-boyfriend (larisa) confirms previous attempts. S/p 6mg total versed and 4mg total of avitan for agitation at [facility]. Patient redirected this AM upon arrival and acute agitation.  -Pending Psych evaluation, attempted to call psychiatry overnight when patient acutely agitated and wanting to leave. Will reattempt to call Psych in AM.   -c/w Constant obs 1:1  -Hold Home Paxil and ambien in setting of OD on these medications.   -C/w monitoring QTC and cardiac telemetry monitor    Cardiac  #QTc prolongation  -Noted QTc prolongation in setting of OD on paxil. QTc at 579 in ED. Repeat EKG on arrival Qtc at 442  -c/w monitoring and repleting electrolytes PRN.  -repeat electrolytes this AM and EKG    Pulmonary  Continue with monitoring respiratory status in setting of sedating medications and benzos. Currently stable.     Prophylactic Measures  VTE: HSQ and SCDs    FULL CODE    Dispo: Admit to [facility] for further HD and cardiac telemetry monitoring in setting of OD and Qtc prolongation.     F: s/p IVF 2L, PO intake.  E: Replete electrolytes to maintain K>4 and Mg>2  N:  Regular Diet as tolerated

## 2018-10-28 NOTE — BEHAVIORAL HEALTH ASSESSMENT NOTE - RISK ASSESSMENT
risk:  male, h/o psychiatric medication use and overdose, impulsivity, prior suicide gesture/attempt, recent breakup, unemployed, limited social support  protective:  physically healthy, domiciled, future orientation

## 2018-10-28 NOTE — PROGRESS NOTE ADULT - PROBLEM SELECTOR PLAN 4
F: None  E: Replete K<4, Mg<2 PRN  N: Regular diet SCr on admission 1.34, downtrended to 1.12. Unclear baseline. Possibly 2/2 hypovolemia 2/2 decreased PO intake vs med effect from overdose.   - Obtain collateral re: baseline kidney function  - Trend BMP

## 2018-10-28 NOTE — PROGRESS NOTE ADULT - PROBLEM SELECTOR PLAN 6
1) PCP Contacted on Admission: (Y/N) --> Name & Phone #:  2) Date of Contact with PCP:  3) PCP Contacted at Discharge: (Y/N)  4) Summary of Handoff Given to PCP:   5) Post-Discharge Appointment Date and Location: DVT PPx: HSQ  GI PPx: None    Dispo: [facility] in preparation for transfer to in psych

## 2018-10-28 NOTE — H&P ADULT - NSHPPHYSICALEXAM_GEN_ALL_CORE
General: Lying in bed comfortably, in NAD  HEENT: Mild conjunctival injection. Anicteric sclera. Unable to assess pupil 2/2 to colored contact lenses. EOMI.   Neck: No distended neck veins, No lymphadenopathy appreciated  Resp: Clear to auscultation bilaterally  Cardiac: S1, S2 appreciated, No murmurs, rubs or gallops. Regular Rate and Rhythm.  GI: Soft, nontender, nondistended with + Bowel sounds  Neuro: Lethargic but arousable, following commands. Speaking full sentences. No dysarthria. Noted horizontal nystagmus on abduction b/l.    Extremities: No edema noted. DP intact.

## 2018-10-28 NOTE — BEHAVIORAL HEALTH ASSESSMENT NOTE - HPI (INCLUDE ILLNESS QUALITY, SEVERITY, DURATION, TIMING, CONTEXT, MODIFYING FACTORS, ASSOCIATED SIGNS AND SYMPTOMS)
Patient is a 23M Malay speaking with PMH of insomnia, depression, depression, presenting for overdose.   Per primary team, on admission to [facility], significant portion of history obtained from ex-boyfriend as patient confused on presentation- Oriented x1 and unsure of how he got to [facility].     As per the ex-boyfriend- the patient has a history of depression and suicide gesture/impulsivity in the past (most recently 3 months ago for which he overdosed on nausea medications. He was living at his fathers house in [place] at the time and was following with a ?psychiatrist after that attempt). Patient is visiting from [facility] with his now ex-boyfriend for 2 months in [place] due to ex-boyfriend has a business trip here.  and due to go home to [place] on November 5th. The ex-boyfriend reports they had been  dating for 6 months and came together to [place] to visit while he works and have been living together in an Airbnb. They recently broke up- as the ex-boyfriend broke up with the patient but had agreed to live together while still in [place] because the patient had no where else to go. On Saturday during the day, the ex-boyfriend was at his work and when he returned the patient was acutely agitated and combative. Typically the ex-boyfriend keeps the medications in his position 2/2 to past attempts that the patient has made to OD on his medications. The ex-boyfriend was concerned he may have taken some other drug that made him acutely agitated and combative. During this fight, patient took the bottle of pills and locked himself in the bathroom to take the full bottles of his medication for which are reported to be: Roxetin and hemitartarate zolpidem for which are the Malay version of Ambien and paxil). The boyfriend attempted to get the patient to through up- but he became less coordinated and remained combative for which the ex-boyfriend had to call the police.     Patient was brought to [facility]. VS on presentation: 164/87, , RR 16, T 98F, O2 Sat 95% on RA. EKG notable for QTc 579. Labs notable for Cr 1.34, Mg 1.6, K 3.3. Patient given 2L of fluid. Patient also noted to be combative and given total of 4mg ativan and 6mg versed. Patient is a 23M Uruguayan speaking with PMH of insomnia, depression, depression, presenting for overdose.   Per primary team, on admission to [facility], significant portion of history obtained from ex-boyfriend as patient confused on presentation- Oriented x1 and unsure of how he got to [facility].     As per the ex-boyfriend- the patient has a history of depression and suicide gesture/impulsivity in the past (most recently 3 months ago for which he overdosed on nausea medications. He was living at his fathers house in [place] at the time and was following with a ?psychiatrist after that attempt). Patient is visiting from [facility] with his now ex-boyfriend for 2 months in [place] due to ex-boyfriend has a business trip here.  They were supposed to return to [place] on November 5th. The ex-boyfriend reports they had been  dating for 6 months and came together to [place] to visit while he works and have been living together in an Airbnb. They recently broke up- as the ex-boyfriend broke up with the patient but had agreed to live together while still in [place] because the patient had no where else to go.  On Saturday during the day, the ex-boyfriend was at his work and when he returned the patient was acutely agitated and combative. Typically the ex-boyfriend keeps the medications in his possession 2/2 to past attempt that the patient has made to OD on his medications. The ex-boyfriend was concerned he may have taken some other drug that made him acutely agitated and combative. During this fight, patient took the bottle of pills and locked himself in the bathroom to take the full bottles of his medication for which are reported to be: Roxetin and hemitartarate zolpidem for which are the [place]rugu[place] version of Ambien and paxil). The boyfriend attempted to get the patient to throw up- but he became confused and remained combative for which the ex-boyfriend had to call the police.     Patient was brought to [facility]. VS on presentation: 164/87, , RR 16, T 98F, O2 Sat 95% on RA. EKG notable for QTc 579. Labs notable for Cr 1.34, Mg 1.6, K 3.3. Patient given 2L of fluid. Patient also noted to be combative and given total of 4mg ativan and 6mg versed.  Upon admission to [facility], on repeat EKG, QTc is normalized to 442.      Patient was seen and evaluated by bedside with assistance of  ID [id number].  Patient states that he has h/o anxiety and insomnia.  Patient confirmed history given by ex-gerber.  He said that he was very impulsive and was not thinking right because he was not able to accept the  breakup and took extra medication.  He said he was very angry and upset and had a similar episode 3 months ago in [place].  Patient denied events leading to this medical admission to be suicide attempt, rather, he explained, he was quick to upset, and after learning his ex bf wanted to break up with him, he felt angry, impulsive, and took 3 then 1 pill of ambien, and went on and take roxetin (paxil) but does not recall how much he took.  then he became confused and does not remember what happened afterward.  Patient reports being an anxious person, worries about his future and things all the time.  He, however, denied feeling depressed until his ex-boyfriend wanted to breakup with him.  He reports fair appetite, energy, concentration.  Sleep is poor at baseline, he ususally not able to sleep unless he is very tired physically after workup out or exercise.  patient denied lovely/hypomania/psychosis.  He denied suicide attempt aside from above mentioned incidence.  Currently patient denied SI/HI/AVH.  When asked to clarified, he said the doctor who prescribes Roxetin and ambien is a neurologist in [place], not a psychiatrist and he only take those medication on as needed basis.  At the end of interview, patient inquired when he will be discharged from hospital, as he wants to buy [employer].

## 2018-10-28 NOTE — BEHAVIORAL HEALTH ASSESSMENT NOTE - SUMMARY
24 yo Swazi male, German speaking only, staying in [place] with ex-boyfriend at Air bnb for two months, prior h/o depression/anxiety/insomnia, being treated by neurologist in [place] with Ambien and Roxetin (Paxil), [facility] to [facility] initially then transferred to [facility] for stabilization due to sp overdose unknown amount of ambien and Roxetin yesterday in context of  breakup with ex-boyfriend, he was confused and aaox1 on admission, EKG initially showed prolong QTc 579, normalized to 442 on repeat EKG.  On evaluation today, patient states having anxiety and insomnia issues, reports breakup to be trigger to overdose though denied it was a suicide attempt, patient's impulsivity caused serious consequence requiring medical intervention.  he is calm and cooperative on evaluation, future oriented, but does need to be further assessed for safety and possible for inpatient psychiatric admission.

## 2018-10-28 NOTE — PROGRESS NOTE ADULT - PROBLEM SELECTOR PLAN 5
DVT PPx: HSQ  GI PPx: None    Dispo: [facility] in preparation for transfer to in psych F: None  E: Replete K<4, Mg<2 PRN  N: Regular diet

## 2018-10-28 NOTE — BEHAVIORAL HEALTH ASSESSMENT NOTE - NSBHADMITCOUNSEL_PSY_A_CORE
diagnostic results/impressions and/or recommended studies/risks and benefits of treatment options/importance of adherence to chosen treatment

## 2018-10-28 NOTE — PROGRESS NOTE ADULT - PROBLEM SELECTOR PLAN 2
-Noted QTc prolongation in setting of OD on paxil. QTc at 579 in ED. Repeat EKG on arrival Qtc at 442

## 2018-10-28 NOTE — BEHAVIORAL HEALTH ASSESSMENT NOTE - DIFFERENTIAL
Adjustment d/o with depressed mood and anxiety  r/o MDD with anxious distress  r/o borderline personality d/o  r/o anxiety d/o unspecified

## 2018-10-29 VITALS
DIASTOLIC BLOOD PRESSURE: 71 MMHG | RESPIRATION RATE: 17 BRPM | TEMPERATURE: 98 F | SYSTOLIC BLOOD PRESSURE: 108 MMHG | HEART RATE: 80 BPM | OXYGEN SATURATION: 97 %

## 2018-10-29 LAB
ANION GAP SERPL CALC-SCNC: 11 MMOL/L — SIGNIFICANT CHANGE UP (ref 5–17)
BUN SERPL-MCNC: 13 MG/DL — SIGNIFICANT CHANGE UP (ref 7–23)
CALCIUM SERPL-MCNC: 9.6 MG/DL — SIGNIFICANT CHANGE UP (ref 8.4–10.5)
CHLORIDE SERPL-SCNC: 99 MMOL/L — SIGNIFICANT CHANGE UP (ref 96–108)
CO2 SERPL-SCNC: 28 MMOL/L — SIGNIFICANT CHANGE UP (ref 22–31)
CREAT SERPL-MCNC: 1.1 MG/DL — SIGNIFICANT CHANGE UP (ref 0.5–1.3)
GLUCOSE SERPL-MCNC: 90 MG/DL — SIGNIFICANT CHANGE UP (ref 70–99)
HCT VFR BLD CALC: 47.7 % — SIGNIFICANT CHANGE UP (ref 39–50)
HGB BLD-MCNC: 16.2 G/DL — SIGNIFICANT CHANGE UP (ref 13–17)
MAGNESIUM SERPL-MCNC: 2 MG/DL — SIGNIFICANT CHANGE UP (ref 1.6–2.6)
MCHC RBC-ENTMCNC: 29.1 PG — SIGNIFICANT CHANGE UP (ref 27–34)
MCHC RBC-ENTMCNC: 34 G/DL — SIGNIFICANT CHANGE UP (ref 32–36)
MCV RBC AUTO: 85.8 FL — SIGNIFICANT CHANGE UP (ref 80–100)
PLATELET # BLD AUTO: 198 K/UL — SIGNIFICANT CHANGE UP (ref 150–400)
POTASSIUM SERPL-MCNC: 4 MMOL/L — SIGNIFICANT CHANGE UP (ref 3.5–5.3)
POTASSIUM SERPL-SCNC: 4 MMOL/L — SIGNIFICANT CHANGE UP (ref 3.5–5.3)
RBC # BLD: 5.56 M/UL — SIGNIFICANT CHANGE UP (ref 4.2–5.8)
RBC # FLD: 12.6 % — SIGNIFICANT CHANGE UP (ref 10.3–16.9)
SODIUM SERPL-SCNC: 138 MMOL/L — SIGNIFICANT CHANGE UP (ref 135–145)
WBC # BLD: 4.7 K/UL — SIGNIFICANT CHANGE UP (ref 3.8–10.5)
WBC # FLD AUTO: 4.7 K/UL — SIGNIFICANT CHANGE UP (ref 3.8–10.5)

## 2018-10-29 PROCEDURE — 84100 ASSAY OF PHOSPHORUS: CPT

## 2018-10-29 PROCEDURE — 82962 GLUCOSE BLOOD TEST: CPT

## 2018-10-29 PROCEDURE — 83735 ASSAY OF MAGNESIUM: CPT

## 2018-10-29 PROCEDURE — 70450 CT HEAD/BRAIN W/O DYE: CPT

## 2018-10-29 PROCEDURE — 99233 SBSQ HOSP IP/OBS HIGH 50: CPT

## 2018-10-29 PROCEDURE — 80053 COMPREHEN METABOLIC PANEL: CPT

## 2018-10-29 PROCEDURE — 96372 THER/PROPH/DIAG INJ SC/IM: CPT | Mod: XU

## 2018-10-29 PROCEDURE — 80048 BASIC METABOLIC PNL TOTAL CA: CPT

## 2018-10-29 PROCEDURE — 93005 ELECTROCARDIOGRAM TRACING: CPT

## 2018-10-29 PROCEDURE — 71045 X-RAY EXAM CHEST 1 VIEW: CPT

## 2018-10-29 PROCEDURE — 96375 TX/PRO/DX INJ NEW DRUG ADDON: CPT

## 2018-10-29 PROCEDURE — 90686 IIV4 VACC NO PRSV 0.5 ML IM: CPT

## 2018-10-29 PROCEDURE — 85027 COMPLETE CBC AUTOMATED: CPT

## 2018-10-29 PROCEDURE — 85025 COMPLETE CBC W/AUTO DIFF WBC: CPT

## 2018-10-29 PROCEDURE — 36415 COLL VENOUS BLD VENIPUNCTURE: CPT

## 2018-10-29 PROCEDURE — 99285 EMERGENCY DEPT VISIT HI MDM: CPT | Mod: 25

## 2018-10-29 PROCEDURE — 94640 AIRWAY INHALATION TREATMENT: CPT

## 2018-10-29 PROCEDURE — 80307 DRUG TEST PRSMV CHEM ANLYZR: CPT

## 2018-10-29 PROCEDURE — 99239 HOSP IP/OBS DSCHRG MGMT >30: CPT

## 2018-10-29 PROCEDURE — 96374 THER/PROPH/DIAG INJ IV PUSH: CPT

## 2018-10-29 RX ORDER — ZOLPIDEM TARTRATE 10 MG/1
1 TABLET ORAL
Qty: 0 | Refills: 0 | COMMUNITY

## 2018-10-29 RX ADMIN — HEPARIN SODIUM 5000 UNIT(S): 5000 INJECTION INTRAVENOUS; SUBCUTANEOUS at 14:44

## 2018-10-29 RX ADMIN — INFLUENZA VIRUS VACCINE 0.5 MILLILITER(S): 15; 15; 15; 15 SUSPENSION INTRAMUSCULAR at 18:22

## 2018-10-29 RX ADMIN — HEPARIN SODIUM 5000 UNIT(S): 5000 INJECTION INTRAVENOUS; SUBCUTANEOUS at 06:41

## 2018-10-29 NOTE — DISCHARGE NOTE ADULT - MEDICATION SUMMARY - MEDICATIONS TO STOP TAKING
I will STOP taking the medications listed below when I get home from the hospital:    Ambien 10 mg oral tablet  -- 1 tab(s) by mouth once a day (at bedtime)

## 2018-10-29 NOTE — PROGRESS NOTE ADULT - ASSESSMENT
23M with PMH of depression presenting for overdose of home paxil and ambien in a suicide attempt. Patient admitted to [facility] for further cardiac telemetry monitoring in setting of OD with noted QTc prolongation (579 in ED). Transferred to [facility] (10/29) after QTc was wnl, awaiting possible admission to [facility] (psych) with 1:1.

## 2018-10-29 NOTE — PROGRESS NOTE BEHAVIORAL HEALTH - NSBHFUPINTERVALCCFT_PSY_A_CORE
M Surinamese speaking with PMH of insomnia, depression, depression, presenting for overdose.   Per primary team, on admission to [facility], significant portion of history obtained from ex-boyfriend as patient confused on presentation- Oriented x1 and unsure of how he got to [facility]. As per the ex-boyfriend- the patient has a history of depression and suicide gesture/impulsivity in the past (most recently 3 months ago for which he overdosed on nausea medications. He was living at his fathers house in [facility] at the time and was following with a ?psychiatrist after that attempt). Patient is visiting from [facility] with his now ex-boyfriend for 2 months in [place] due to ex-boyfriend has a business trip here.  They were supposed to return to [facility] on November 5th. The ex-boyfriend reports they had been  dating for 6 months and came together to [place] to visit while he works and have been living together in an Airbnb. They recently broke up- as the ex-boyfriend broke up with the patient but had agreed to live together while still in [place] because the patient had no where else to go.  On Saturday during the day, the ex-boyfriend was at his work and when he returned the patient was acutely agitated and combative. Typically the ex-boyfriend keeps the medications in his possession 2/2 to past attempt that the patient has made to OD on his medications. The ex-boyfriend was concerned he may have taken some other drug that made him acutely agitated and combative. During this fight, patient took the bottle of pills and locked himself in the bathroom to take the full bottles of his medication for which are reported to be: Roxetin and hemitartarate zolpidem for which are the Surinamese version of Ambien and paxil). The boyfriend attempted to get the patient to throw up- but he became confused and remained combative for which the ex-boyfriend had to call the police.     Patient was brought to [facility]. VS on presentation: 164/87, , RR 16, T 98F, O2 Sat 95% on RA. EKG notable for QTc 579. Labs notable for Cr 1.34, Mg 1.6, K 3.3. Patient given 2L of fluid. Patient also noted to be combative and given total of 4mg ativan and 6mg versed.  Upon admission to [facility], on repeat EKG, QTc is normalized to 442.  Patient was initially evaluated by Psychiatry on Juni 10/28. At the time patient reported that his suicidal gesture was impulsive, in context of a conflict with his exboyfriend.

## 2018-10-29 NOTE — PROGRESS NOTE ADULT - PROBLEM SELECTOR PLAN 3
Found to be in sinus tach up to the 110s on tele monitor. As patient calms down his HR seems to stay in the 90s. Likely 2/2 drug overdose  - no longer tachycardic (10/29)

## 2018-10-29 NOTE — DISCHARGE NOTE ADULT - MEDICATION SUMMARY - MEDICATIONS TO TAKE
I will START or STAY ON the medications listed below when I get home from the hospital:    PARoxetine 20 mg oral tablet  -- 1 tab(s) by mouth once a day  -- Indication: For Depression

## 2018-10-29 NOTE — PROGRESS NOTE BEHAVIORAL HEALTH - NSBHFUPINTERVALHXFT_PSY_A_CORE
Malay  #[id number]. Upon interview this morning, patient again states that his gesture was impulsive. He states that he became angry with his ex-boyfriend when he noticed that he was exchanging text messages with another ex-boyfriend. Patient reports feeling anxious and angry prior to taking the OD. He repeats that he took the "wrong dosage" which caused him to become confused. He denies intention to die and expresses regret for his gesture. He reports that his "loves" himself too much to hurt himself. Patient denies any depressive symptoms. He endorses anxiety. He reports that he has been taking paxil as needed for anxiety but it's causing  worse  symptoms. Pashto  #[id number]. Upon interview this morning, patient again states that his gesture was impulsive. He states that he became angry with his ex-boyfriend when he noticed that he was exchanging text messages with another ex-boyfriend. Patient reports feeling anxious and angry prior to taking the OD. He repeats that he took the "wrong dosage" which caused him to become confused. He denies intention to die and expresses regret for his gesture. He denies any passive or active suicidal ideation. He reports that his "loves" himself too much to hurt himself. Patient denies any depressive symptoms. He endorses anxiety. He reports that he has been taking paxil as needed for anxiety but it's causing  worse  symptoms. Patient is interested in starting a more consistent psychiatric treatment  (psychotherapy and medication management) when he returns to [place]. He presents goal directed and future oriented.

## 2018-10-29 NOTE — DISCHARGE NOTE ADULT - ABILITY TO HEAR (WITH HEARING AID OR HEARING APPLIANCE IF NORMALLY USED):
CC/HPI:  Colton Nickerson is a 24 year old year old male who presents for nausea and vomiting for 2 weeks.  He appears to have lost 5 pounds in the past month.  He vomits each morning after waking.  He is well the rest of the day except for yesterday.  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  He mainly eats at the restaurant.  He only has to use the restroom once at night.  No abdominal pain except for yesterday.  Headache only this morning..    Past Medical History:   Diagnosis Date   • Childhood asthma     asymptomatic since ~2008   • Obesity, Class I, BMI 30-34.9        Past Surgical History:   Procedure Laterality Date   • NO PAST SURGERIES         ALLERGIES:  No Known Allergies    No current outpatient prescriptions on file.     No current facility-administered medications for this visit.        No family history on file.    Social History     Social History   • Marital status: Single     Spouse name: N/A   • Number of children: N/A   • Years of education: N/A     Occupational History   • student - PT      [employer]     Social History Main Topics   • Smoking status: Never Smoker   • Smokeless tobacco: Never Used   • Alcohol use No   • Drug use: No   • Sexual activity: Not Asked     Other Topics Concern   • None     Social History Narrative   • None       REVIEW OF SYSTEMS: GENERAL:  Patient denies fever, chills, tiredness, malaise, weight loss, weight gain.  EYES:  Patient denies blurred vision, double vision, pain.   NEUROLOGIC:  Patient denies tremors, dizzy spells, numbness, tingling, but complains of headache this morning only.  ENDOCRINE:  Patient denies excessive thirst, heat intolerance, tired/sluggishness.  GASTROINTESTINAL:  Patient denies indigestion/heartburn, diarrhea, constipation, but complains of  abdominal pain, vomiting and diarrhea.  CARDIOVASCUALR:  Patient denies chest pain, shortness of breath, palpitations.  SKIN:  Patient denies skin rashes, boils, persistent  itching.  MUSCULOSKELETAL:  Patient denies joint pain, neck pain, back pain.  ENT/MOUTH:  Patient denies ear infections, sore throats, sinus problems.  :  Patient denies urine retention, painful urination, urinary frequency, blood in urine.  RESPIRATORY:  Patient denies wheezing, frequent cough, shortness of breath.    PHYSICAL EXAMINATION:   General:   awake, alert and oriented and in no acute distress  Vitals:   Visit Vitals  /72 (BP Location: [facility], Patient Position: Sitting, Cuff Size: Large Adult)   Pulse 87   Ht 5' 7.72\" (1.72 m)   Wt 110.2 kg   SpO2 96%   BMI 37.25 kg/m²     HEENT:   normocephalic, atraumatic, pupils equal, round and reactive to light and accommodation, tympanic membranes clear, nasal mucosa normal and pharynx normal  Lymph nodes: No nodes palpated on the head, neck or supraclavicular  Neck:    no thyromegaly, no JVD and no neck masses  Lungs:    clear to auscultation, good air entry, no rales or wheezes and no rhonchi  Cardiovascular:   no JVD, S1 and S2 normal, no S3 or S4, no clicks, rubs or murmurs and regular rate and rhythm  Abdomen:   soft, non-tender, nondistended, no hepatosplenomegaly, normal active bowel sounds and no masses palpated    IMMUNIZATIONS:   Immunization History   Administered Date(s) Administered   • DTaP 1995, 1995, 1995, 02/25/1997, 06/12/2000   • HEPATITIS A CHILD 09/19/2012   • HIB 1995, 02/25/1997   • Hepatitis B Child 1995, 1995, 02/25/1997   • MMR 02/25/1997, 06/12/2000   • Meningococcal Conjugate MCV4P (Menactra) 04/07/2008, 08/04/2011   • Polio, INACTIVATED 1995, 1995, 02/25/1997, 06/12/2000   • Tdap 04/07/2008       ASSESSMENT:  1. Nausea and vomiting x 2 wks    PLAN  1. CBC CMP   2. Omeprazole 20 mg daily for 2 wks  3. Follow up if no improvement.     Adequate: hears normal conversation without difficulty

## 2018-10-29 NOTE — DISCHARGE NOTE ADULT - CARE PLAN
Principal Discharge DX:	Drug overdose, intentional self-harm, initial encounter  Goal:	Please call 911 if you feel suicidal. Please see your neurologist as soon as you get back to [place].  Assessment and plan of treatment:	You came to the hospital because you overdosed on ambien and your antidepressant Roxetin. Psychiatry evaluated you in the hospital and state that you are safe for discharge. Psychiatry educated you on how to take Roxetin correctly as a daily medication, and not as needed. Psychiatry told you to stop taking ambien. Please follow up with your neurologist in [place]. He will refer you to a psychiatrist and a psychologist. Please call 911 if you have done something on purpose to your yourself or if you make a plan to commit suicide. If you are considering suicide please contact a parent, therapist, or healthcare provider and tell the person about your thoughts. If you feel suicidal please keep medicines, weapons, and alcohol out of your home. If you feel suicidal do not spend time alone. Ask someone to stay with you if you have thoughts of committing suicide or think you may try it.  Secondary Diagnosis:	Depression  Goal:	Please take Roxetin every day at the same time and don't just take it as needed.  Assessment and plan of treatment:	You have a history of depression. You took to much of your Roxetin and ambien and were hospitalized. Please stop taking ambien.

## 2018-10-29 NOTE — PROGRESS NOTE ADULT - SUBJECTIVE AND OBJECTIVE BOX
SUBJECTIVE / INTERVAL HPI:   Qatari  used, ID# [id number]. FLORESITA o/n as per patient and PCA. Reports depressed mood, but improved from admission. Denies active or passive suicidal ideation, homicidal ideation, and visual or auditory hallucinations. ROS otherwise negative.     VITAL SIGNS:  T(C): 36.4 (29 Oct 2018 08:42), Max: 36.8 (29 Oct 2018 05:23)  T(F): 97.6 (29 Oct 2018 08:42), Max: 98.3 (29 Oct 2018 05:23)  HR: 80 (29 Oct 2018 08:42) (77 - 110)  BP: 108/71 (29 Oct 2018 08:42) (108/71 - 138/76)  BP(mean): 98 (28 Oct 2018 20:25) (80 - 99)  RR: 17 (29 Oct 2018 08:42) (15 - 26)  SpO2: 97% (29 Oct 2018 08:42) (95% - 97%)    PHYSICAL EXAM:  General: NAD  HEENT: NCAT; PERRL, anicteric sclera  Cardiovascular: S1, S2 normal; RRR, no M/G/R  Respiratory: CTABL; no W/R/R  Gastrointestinal: soft, nontender, nondistended. bowel sounds present in all 4 quadrants.  Skin: no ulcerations or visible rashes appreciated  Extremities: WWP; no edema, clubbing or cyanosis  Vascular: 2+ radial, DP/PT pulses B/L  Neurological: AAOx3; no focal deficits    MEDICATIONS:  heparin  Injectable 5000 Unit(s) SubCutaneous every 8 hours  influenza   Vaccine 0.5 milliLiter(s) IntraMuscular once    ALLERGIES:  No Known Allergies    LABS:                        16.2   4.7   )-----------( 198      ( 29 Oct 2018 06:26 )             47.7     10-29    138  |  99  |  13  ----------------------------<  90  4.0   |  28  |  1.10    Ca    9.6      29 Oct 2018 06:26  Phos  3.0     10-28  Mg     2.0     10-29    TPro  7.1  /  Alb  4.4  /  TBili  0.6  /  DBili  x   /  AST  30  /  ALT  19  /  AlkPhos  44  10-28        CAPILLARY BLOOD GLUCOSE      POCT Blood Glucose.: 92 mg/dL (27 Oct 2018 17:55)      RADIOLOGY & ADDITIONAL TESTS: Reviewed. SUBJECTIVE / INTERVAL HPI:   Northern Irish  used, ID# [id number]. FLORESITA o/n as per patient and PCA. Reports depressed mood, but improved from admission. Denies active or passive suicidal ideation, homicidal ideation, and visual or auditory hallucinations. Pt states he regrets overdosing and that it was a mistake. He doesnt remember what meds he overdosed on. ROS otherwise negative.     VITAL SIGNS:  T(C): 36.4 (29 Oct 2018 08:42), Max: 36.8 (29 Oct 2018 05:23)  T(F): 97.6 (29 Oct 2018 08:42), Max: 98.3 (29 Oct 2018 05:23)  HR: 80 (29 Oct 2018 08:42) (77 - 110)  BP: 108/71 (29 Oct 2018 08:42) (108/71 - 138/76)  BP(mean): 98 (28 Oct 2018 20:25) (80 - 99)  RR: 17 (29 Oct 2018 08:42) (15 - 26)  SpO2: 97% (29 Oct 2018 08:42) (95% - 97%)    PHYSICAL EXAM:  General: NAD  HEENT: NCAT; PERRL, anicteric sclera  Cardiovascular: S1, S2 normal; RRR, no M/G/R  Respiratory: CTABL; no W/R/R  Gastrointestinal: soft, nontender, nondistended. bowel sounds present in all 4 quadrants.  Skin: no ulcerations or visible rashes appreciated  Extremities: WWP; no edema, clubbing or cyanosis  Vascular: 2+ radial, DP/PT pulses B/L  Neurological: AAOx3; no focal deficits    MEDICATIONS:  heparin  Injectable 5000 Unit(s) SubCutaneous every 8 hours  influenza   Vaccine 0.5 milliLiter(s) IntraMuscular once    ALLERGIES:  No Known Allergies    LABS:                        16.2   4.7   )-----------( 198      ( 29 Oct 2018 06:26 )             47.7     10-29    138  |  99  |  13  ----------------------------<  90  4.0   |  28  |  1.10    Ca    9.6      29 Oct 2018 06:26  Phos  3.0     10-28  Mg     2.0     10-29    TPro  7.1  /  Alb  4.4  /  TBili  0.6  /  DBili  x   /  AST  30  /  ALT  19  /  AlkPhos  44  10-28        CAPILLARY BLOOD GLUCOSE      POCT Blood Glucose.: 92 mg/dL (27 Oct 2018 17:55)      RADIOLOGY & ADDITIONAL TESTS: Reviewed.

## 2018-10-29 NOTE — PROGRESS NOTE BEHAVIORAL HEALTH - NSBHADMITCOUNSEL_PSY_A_CORE
diagnostic results/impressions and/or recommended studies/importance of adherence to chosen treatment

## 2018-10-29 NOTE — PROGRESS NOTE BEHAVIORAL HEALTH - SUMMARY
24 yo Nigerian male, Guinean speaking only, staying in [place] with ex-boyfriend at [facility] for two months, prior h/o depression/anxiety/insomnia, being treated by neurologist in [place] with Ambien and Roxetin (Paxil), bib EMS to [facility] initially then transferred to [facility] for stabilization due to sp overdose unknown amount of ambien and Roxetin on 10/28 in context of  breakup with ex-boyfriend. On admission he was confused, aaox1 and his EKG showed prolong QTc 579 (normalized to 442 on repeat EKG). On evaluation patient denies intent to hurt himself and expresses regret for his gesture. He reports that the OD was an impulsive gesture precipitated by feeling angry and anxious with his ex-boyfriend. He denies any SI and reports motivation to start more consistent psychiatric and psychological treatment.  Plan:  -obtain collateral information from exboyfriend  -patient does not currently meet criteria for involuntary psychiatric treatment  -patient reports motivation for outpatient treatment: to ask his neurologist in [facility] for a referral to see a psychiatrist and psychologist  -patient was educated on how to take paxil correctly (daily medication, not as needed)  -patient to stop taking ambien

## 2018-10-29 NOTE — PROGRESS NOTE BEHAVIORAL HEALTH - NSBHCHARTREVIEWLAB_PSY_A_CORE FT
CBC Full  -  ( 29 Oct 2018 06:26 )  WBC Count : 4.7 K/uL  Hemoglobin : 16.2 g/dL  Hematocrit : 47.7 %  Platelet Count - Automated : 198 K/uL  Mean Cell Volume : 85.8 fL  Mean Cell Hemoglobin : 29.1 pg  Mean Cell Hemoglobin Concentration : 34.0 g/dL  Auto Neutrophil # : x  Auto Lymphocyte # : x  Auto Monocyte # : x  Auto Eosinophil # : x  Auto Basophil # : x  Auto Neutrophil % : x  Auto Lymphocyte % : x  Auto Monocyte % : x  Auto Eosinophil % : x  Auto Basophil % : x  10-29    138  |  99  |  13  ----------------------------<  90  4.0   |  28  |  1.10    Ca    9.6      29 Oct 2018 06:26  Phos  3.0     10-28  Mg     2.0     10-29    TPro  7.1  /  Alb  4.4  /  TBili  0.6  /  DBili  x   /  AST  30  /  ALT  19  /  AlkPhos  44  10-28

## 2018-10-29 NOTE — DISCHARGE NOTE ADULT - PLAN OF CARE
Please call 911 if you feel suicidal. Please see your neurologist as soon as you get back to [place]. You came to the hospital because you overdosed on ambien and your antidepressant Roxetin. Psychiatry evaluated you in the hospital and state that you are safe for discharge. Psychiatry educated you on how to take Roxetin correctly as a daily medication, and not as needed. Psychiatry told you to stop taking ambien. Please follow up with your neurologist in [place]. He will refer you to a psychiatrist and a psychologist. Please call 911 if you have done something on purpose to your yourself or if you make a plan to commit suicide. If you are considering suicide please contact a parent, therapist, or healthcare provider and tell the person about your thoughts. If you feel suicidal please keep medicines, weapons, and alcohol out of your home. If you feel suicidal do not spend time alone. Ask someone to stay with you if you have thoughts of committing suicide or think you may try it. Please take Roxetin every day at the same time and don't just take it as needed. You have a history of depression. You took to much of your Roxetin and ambien and were hospitalized. Please stop taking ambien.

## 2018-10-29 NOTE — PROGRESS NOTE BEHAVIORAL HEALTH - NSBHCHARTREVIEWINVESTIGATE_PSY_A_CORE FT
< from: 12 Lead ECG (10.28.18 @ 02:39) >    Ventricular Rate 86 BPM    Atrial Rate 86 BPM    P-R Interval 156 ms    QRS Duration 86 ms    Q-T Interval 370 ms    QTC Calculation(Bezet) 442 ms    P Axis 75 degrees    R Axis 34 degrees    T Axis 27 degrees    Diagnosis Line Normal sinus rhythm  Right atrial enlargement  Nonspecific ST and T wave abnormality    Confirmed by FARIDA JENNINGS, DAVID ([id number]) on 10/29/2018 9:20:01 AM    < end of copied text >

## 2018-10-29 NOTE — PROGRESS NOTE ADULT - PROBLEM SELECTOR PLAN 6
DVT PPx: IMPROVE 0, no need for pharmacoprophylaxis   GI PPx: none    FULL CODE   Dispo: [facility] in preparation for transfer to inpatient psychiatry

## 2018-10-29 NOTE — PROGRESS NOTE ADULT - PROBLEM SELECTOR PLAN 2
Noted QTc prolongation in setting of OD on paxil. QTc at 579 in ED. Repeat EKG on arrival Qtc at 442.  - f/u AM EKG   - c/w monitoring QTc and replete electrolytes PRN Noted QTc prolongation in setting of OD on paxil. QTc at 579 in ED. Repeat EKG on arrival Qtc at 442.  - AM  (10/29)  - c/w monitoring QTc and replete electrolytes PRN

## 2018-10-29 NOTE — PROGRESS NOTE ADULT - PROBLEM SELECTOR PLAN 4
SCr on admission 1.34, downtrended to 1.12. Unclear baseline. Possibly 2/2 hypovolemia 2/2 decreased PO intake vs med effect from overdose.   - SCr continuing to downtrend 1.10 (10/29)

## 2018-10-29 NOTE — DISCHARGE NOTE ADULT - PATIENT PORTAL LINK FT
You can access the [facility], offered by [facility], by registering with the following website: [email or web address]

## 2018-10-29 NOTE — PROGRESS NOTE BEHAVIORAL HEALTH - NSBHCHARTREVIEWVS_PSY_A_CORE FT
Vital Signs Last 24 Hrs  T(C): 36.4 (29 Oct 2018 08:42), Max: 36.8 (29 Oct 2018 05:23)  T(F): 97.6 (29 Oct 2018 08:42), Max: 98.3 (29 Oct 2018 05:23)  HR: 80 (29 Oct 2018 08:42) (77 - 110)  BP: 108/71 (29 Oct 2018 08:42) (108/71 - 138/76)  BP(mean): 98 (28 Oct 2018 20:25) (86 - 99)  RR: 17 (29 Oct 2018 08:42) (17 - 26)  SpO2: 97% (29 Oct 2018 08:42) (95% - 97%)

## 2018-10-29 NOTE — PROGRESS NOTE BEHAVIORAL HEALTH - RISK ASSESSMENT
risk:  male, h/o psychiatric medication use and overdose, impulsivity, prior suicide gesture/attempt, recent breakup, unemployed, limited social support  protective:  physically healthy, domiciled, future orientation, no active SI,, no current psychiatric symptoms, good support system

## 2018-10-29 NOTE — DISCHARGE NOTE ADULT - HOSPITAL COURSE
23M [place] Islander speaking with PMH of insomnia, depression presenting for overdose on unknown amount of ambien and Roxetin(Paxil) in context of breakup with ex boyfriend. Upon presentation to [facility] pt was lethargic but arousable. AAOx1. VS: 164/87, , RR 16, T 98F, O2 Sat 95% on RA. EKG notable for QTc 579. Labs notable for Cr 1.34, Mg 1.6, K 3.3. Patient given 2L of fluid. Patient also noted to be combative and given total of 4mg ativan and 6mg versed. Pt admitted to [facility] for cardiac telemetry monitoring in setting of overdose with prolonged QTc of 579 in the ED. On [facility] the patient was stable and qtc decreased to 442 so patient was stepped down to [facility]. On the floors pt stable and . Psychiatry was consulted and stated that pt does not meet criteria for involuntary psychiatric treatment as patient denies intent to hurt himself and expresses regret for his gesture. Pt reports motivation for outpatient treatment and he will ask his neurologist in [facility] for a referral to see a psychiatrist and psychologist. Psychiatry education patient on how to take paxil correctly as a daily medication and not as needed. Ambien was discontinued.  Pt hemodynamically stable for discharge.

## 2018-10-29 NOTE — PROGRESS NOTE ADULT - PROBLEM SELECTOR PLAN 1
Admits to suicide attempt and ex-boyfriend (Eyad) confirms previous attempts. S/p 6mg total versed and 4mg total of ativan for agitation at [facility]. Utox negative. No active suicidal ideation on 10/29.   - Atarax 50mg q6H prn insomnia/agitation/anxiety as per psych  - psych consulted for SI, d/c to [facility]   - c/w Constant obs 1:1  - continue to hold Paxil and ambien in setting of OD on these medications.

## 2020-03-11 NOTE — ED ADULT NURSE NOTE - PAIN RATING/NUMBER SCALE (0-10): ACTIVITY
Pt is traveling to [place] on 3/ 13 and needs a refill on ipratropium (ATROVENT) 0.06 % nasal spray, but its not time. Pt wants to know if he can get a early fill if not the pt would like to know the names of some over the counter medication he can take. Please advise [phone]          Patient requesting a medication refill.   Medication:ipratropium (ATROVENT) 0.06 % nasal spray  Pharmacy: [address]  Last office visit: 11/13/19  Next office visit: 4/22/2020 0

## 2020-11-07 NOTE — H&P ADULT - NSHPSOCIALHISTORY_GEN_ALL_CORE
Anesthesia Pre Eval Note    Anesthesia ROS/Med Hx          Cardiovascular Review:    Positive for hyperlipidemia    End/Other Review:    Positive for obesity class III - 40.00 - 49.99      Relevant Problems   No relevant active problems       Physical Exam     Airway   Mallampati: II      Anesthesia Plan    ASA Status: 2    Anesthesia Type: General        
Tobacco: Denied  ETOH: DEnied  Other drugs: Denied.   Reports taking gym supplements- multivitamin vs protein?

## 2021-04-30 NOTE — BEHAVIORAL HEALTH ASSESSMENT NOTE - LANGUAGE
Additional Notes: Patient consent was obtained to proceed with the visit and recommended plan of care after discussion of all risks and benefits, including the risks of COVID-19 exposure. Detail Level: Simple No abnormalities noted

## 2021-06-02 NOTE — DISCHARGE NOTE ADULT - CONTRAINDICATIONS & PRECAUTIONS (SELECT ALL THAT APPLY)
[FreeTextEntry1] : Mr. THORPE is a 51 year  male, who present to the office for physical examm Patient/surrogate refused vaccine...

## 2023-05-30 NOTE — PROGRESS NOTE BEHAVIORAL HEALTH - PRIMARY DX
Problem: Discharge Planning  Goal: Discharge to home or other facility with appropriate resources  Recent Flowsheet Documentation  Taken 5/30/2023 0800 by Mendoza Law RN  Discharge to home or other facility with appropriate resources: Identify barriers to discharge with patient and caregiver     Problem: Pain  Goal: Verbalizes/displays adequate comfort level or baseline comfort level  Recent Flowsheet Documentation  Taken 5/30/2023 1200 by Mendoza Law RN  Verbalizes/displays adequate comfort level or baseline comfort level: Assess pain using appropriate pain scale  5/30/2023 0955 by Mendoza Law RN  Outcome: Progressing  Flowsheets  Taken 5/30/2023 0800 by Mendoza Law RN  Verbalizes/displays adequate comfort level or baseline comfort level: Encourage patient to monitor pain and request assistance  Taken 5/30/2023 0600 by Bhavesh Garrison. Fco Cabrera RN  Verbalizes/displays adequate comfort level or baseline comfort level: Assess pain using appropriate pain scale  Taken 5/30/2023 0400 by Bhavesh Garrison. Fco Cabrera RN  Verbalizes/displays adequate comfort level or baseline comfort level: Assess pain using appropriate pain scale  Taken 5/30/2023 0200 by Bhavesh Garrison.  HUBER Silva  Verbalizes/displays adequate comfort level or baseline comfort level: Assess pain using appropriate pain scale Adjustment disorder
